# Patient Record
Sex: MALE | Race: BLACK OR AFRICAN AMERICAN | NOT HISPANIC OR LATINO | Employment: UNEMPLOYED | URBAN - METROPOLITAN AREA
[De-identification: names, ages, dates, MRNs, and addresses within clinical notes are randomized per-mention and may not be internally consistent; named-entity substitution may affect disease eponyms.]

---

## 2017-04-19 ENCOUNTER — HISTORICAL (OUTPATIENT)
Dept: ENDOSCOPY | Facility: HOSPITAL | Age: 77
End: 2017-04-19

## 2017-05-18 ENCOUNTER — HISTORICAL (OUTPATIENT)
Dept: ENDOSCOPY | Facility: HOSPITAL | Age: 77
End: 2017-05-18

## 2017-12-31 LAB
INFLUENZA A ANTIGEN, POC: POSITIVE
INFLUENZA B ANTIGEN, POC: NEGATIVE

## 2018-01-03 ENCOUNTER — HISTORICAL (OUTPATIENT)
Dept: ADMINISTRATIVE | Facility: HOSPITAL | Age: 78
End: 2018-01-03

## 2018-01-03 LAB
APPEARANCE, UA: ABNORMAL
BACTERIA SPEC CULT: ABNORMAL /HPF
BILIRUB UR QL STRIP: ABNORMAL
COLOR UR: ABNORMAL
GLUCOSE (UA): NEGATIVE
HGB UR QL STRIP: NEGATIVE
KETONES UR QL STRIP: NEGATIVE
LEUKOCYTE ESTERASE UR QL STRIP: NEGATIVE
NITRITE UR QL STRIP: NEGATIVE
PH UR STRIP: 6 [PH] (ref 5–9)
PROT UR QL STRIP: ABNORMAL
RBC #/AREA URNS HPF: ABNORMAL /[HPF]
SP GR UR STRIP: 1.03 (ref 1–1.03)
SQUAMOUS EPITHELIAL, UA: ABNORMAL
UROBILINOGEN UR STRIP-ACNC: 1
WBC #/AREA URNS HPF: ABNORMAL /HPF

## 2018-11-19 ENCOUNTER — HISTORICAL (OUTPATIENT)
Dept: ADMINISTRATIVE | Facility: HOSPITAL | Age: 78
End: 2018-11-19

## 2019-09-26 ENCOUNTER — HOSPITAL ENCOUNTER (OUTPATIENT)
Dept: MEDSURG UNIT | Facility: HOSPITAL | Age: 79
End: 2019-09-27
Attending: INTERNAL MEDICINE | Admitting: INTERNAL MEDICINE

## 2019-09-26 LAB
ABS NEUT (OLG): 4.4 X10(3)/MCL (ref 2.1–9.2)
ALBUMIN SERPL-MCNC: 4 GM/DL (ref 3.4–5)
ALBUMIN/GLOB SERPL: 1.3 RATIO (ref 1.1–2)
ALP SERPL-CCNC: 98 UNIT/L (ref 50–136)
ALT SERPL-CCNC: 7 UNIT/L (ref 12–78)
AST SERPL-CCNC: 15 UNIT/L (ref 15–37)
BASOPHILS # BLD AUTO: 0 X10(3)/MCL (ref 0–0.2)
BASOPHILS NFR BLD AUTO: 0 %
BILIRUB SERPL-MCNC: 0.7 MG/DL (ref 0.2–1)
BILIRUBIN DIRECT+TOT PNL SERPL-MCNC: 0.2 MG/DL (ref 0–0.5)
BILIRUBIN DIRECT+TOT PNL SERPL-MCNC: 0.5 MG/DL (ref 0–0.8)
BNP BLD-MCNC: 185 PG/ML (ref 0–100)
BUN SERPL-MCNC: 35 MG/DL (ref 7–18)
CALCIUM SERPL-MCNC: 9.3 MG/DL (ref 8.5–10.1)
CHLORIDE SERPL-SCNC: 104 MMOL/L (ref 98–107)
CO2 SERPL-SCNC: 31 MMOL/L (ref 21–32)
CREAT SERPL-MCNC: 1.61 MG/DL (ref 0.7–1.3)
EOSINOPHIL # BLD AUTO: 0.3 X10(3)/MCL (ref 0–0.9)
EOSINOPHIL NFR BLD AUTO: 4 %
ERYTHROCYTE [DISTWIDTH] IN BLOOD BY AUTOMATED COUNT: 13.1 % (ref 11.5–17)
GLOBULIN SER-MCNC: 3.1 GM/DL (ref 2.4–3.5)
GLUCOSE SERPL-MCNC: 108 MG/DL (ref 74–106)
HCT VFR BLD AUTO: 42.5 % (ref 42–52)
HGB BLD-MCNC: 13.5 GM/DL (ref 14–18)
LYMPHOCYTES # BLD AUTO: 1.7 X10(3)/MCL (ref 0.6–4.6)
LYMPHOCYTES NFR BLD AUTO: 25 %
MCH RBC QN AUTO: 31.1 PG (ref 27–31)
MCHC RBC AUTO-ENTMCNC: 31.8 GM/DL (ref 33–36)
MCV RBC AUTO: 97.9 FL (ref 80–94)
MONOCYTES # BLD AUTO: 0.6 X10(3)/MCL (ref 0.1–1.3)
MONOCYTES NFR BLD AUTO: 8 %
NEUTROPHILS # BLD AUTO: 4.4 X10(3)/MCL (ref 2.1–9.2)
NEUTROPHILS NFR BLD AUTO: 63 %
PLATELET # BLD AUTO: 185 X10(3)/MCL (ref 130–400)
PMV BLD AUTO: 10 FL (ref 9.4–12.4)
POC TROPONIN: 0.01 NG/ML (ref 0–0.08)
POTASSIUM SERPL-SCNC: 4 MMOL/L (ref 3.5–5.1)
PROT SERPL-MCNC: 7.1 GM/DL (ref 6.4–8.2)
RBC # BLD AUTO: 4.34 X10(6)/MCL (ref 4.7–6.1)
SODIUM SERPL-SCNC: 140 MMOL/L (ref 136–145)
WBC # SPEC AUTO: 7 X10(3)/MCL (ref 4.5–11.5)

## 2019-09-27 LAB
CK MB SERPL-MCNC: 1.6 NG/ML (ref 0.5–3.6)
CK MB SERPL-MCNC: 2 NG/ML (ref 0.5–3.6)
CK SERPL-CCNC: 114 UNIT/L (ref 39–308)
CK SERPL-CCNC: 94 UNIT/L (ref 39–308)
TROPONIN I SERPL-MCNC: 0.02 NG/ML (ref 0.02–0.49)
TROPONIN I SERPL-MCNC: 0.02 NG/ML (ref 0.02–0.49)

## 2019-10-14 ENCOUNTER — HISTORICAL (OUTPATIENT)
Dept: LAB | Facility: HOSPITAL | Age: 79
End: 2019-10-14

## 2019-10-14 LAB
BUN SERPL-MCNC: 26 MG/DL (ref 7–18)
CALCIUM SERPL-MCNC: 9.3 MG/DL (ref 8.5–10.1)
CHLORIDE SERPL-SCNC: 105 MMOL/L (ref 98–107)
CO2 SERPL-SCNC: 31 MMOL/L (ref 21–32)
CREAT SERPL-MCNC: 1.37 MG/DL (ref 0.7–1.3)
CREAT/UREA NIT SERPL: 19
GLUCOSE SERPL-MCNC: 93 MG/DL (ref 74–106)
MAGNESIUM SERPL-MCNC: 2.5 MG/DL (ref 1.8–2.4)
POTASSIUM SERPL-SCNC: 3.9 MMOL/L (ref 3.5–5.1)
SODIUM SERPL-SCNC: 141 MMOL/L (ref 136–145)

## 2019-10-28 ENCOUNTER — HISTORICAL (OUTPATIENT)
Dept: LAB | Facility: HOSPITAL | Age: 79
End: 2019-10-28

## 2019-10-28 LAB
ABS NEUT (OLG): 3.74 X10(3)/MCL (ref 2.1–9.2)
ALBUMIN SERPL-MCNC: 3.9 GM/DL (ref 3.4–5)
ALBUMIN/GLOB SERPL: 1.3 RATIO (ref 1.1–2)
ALP SERPL-CCNC: 112 UNIT/L (ref 50–136)
ALT SERPL-CCNC: 11 UNIT/L (ref 12–78)
APPEARANCE, UA: CLEAR
AST SERPL-CCNC: 16 UNIT/L (ref 15–37)
BACTERIA SPEC CULT: ABNORMAL /HPF
BASOPHILS # BLD AUTO: 0 X10(3)/MCL (ref 0–0.2)
BASOPHILS NFR BLD AUTO: 0 %
BILIRUB SERPL-MCNC: 0.6 MG/DL (ref 0.2–1)
BILIRUB UR QL STRIP: ABNORMAL
BILIRUBIN DIRECT+TOT PNL SERPL-MCNC: 0.2 MG/DL (ref 0–0.5)
BILIRUBIN DIRECT+TOT PNL SERPL-MCNC: 0.4 MG/DL (ref 0–0.8)
BNP BLD-MCNC: 162 PG/ML (ref 0–125)
BUN SERPL-MCNC: 21 MG/DL (ref 7–18)
CALCIUM SERPL-MCNC: 9.2 MG/DL (ref 8.5–10.1)
CHLORIDE SERPL-SCNC: 107 MMOL/L (ref 98–107)
CO2 SERPL-SCNC: 31 MMOL/L (ref 21–32)
COLOR UR: ABNORMAL
CREAT SERPL-MCNC: 1.06 MG/DL (ref 0.7–1.3)
EOSINOPHIL # BLD AUTO: 0.2 X10(3)/MCL (ref 0–0.9)
EOSINOPHIL NFR BLD AUTO: 4 %
ERYTHROCYTE [DISTWIDTH] IN BLOOD BY AUTOMATED COUNT: 13.8 % (ref 11.5–17)
GLOBULIN SER-MCNC: 3 GM/DL (ref 2.4–3.5)
GLUCOSE (UA): ABNORMAL
GLUCOSE SERPL-MCNC: 81 MG/DL (ref 74–106)
HCT VFR BLD AUTO: 37 % (ref 42–52)
HGB BLD-MCNC: 11.7 GM/DL (ref 14–18)
HGB UR QL STRIP: NEGATIVE
IMM GRANULOCYTES # BLD AUTO: 0 10*3/UL
IMM GRANULOCYTES NFR BLD AUTO: 1 %
KETONES UR QL STRIP: ABNORMAL
LEUKOCYTE ESTERASE UR QL STRIP: NEGATIVE
LYMPHOCYTES # BLD AUTO: 1.4 X10(3)/MCL (ref 0.6–4.6)
LYMPHOCYTES NFR BLD AUTO: 25 %
MAGNESIUM SERPL-MCNC: 2.3 MG/DL (ref 1.8–2.4)
MCH RBC QN AUTO: 31.1 PG (ref 27–31)
MCHC RBC AUTO-ENTMCNC: 31.6 GM/DL (ref 33–36)
MCV RBC AUTO: 98.4 FL (ref 80–94)
MONOCYTES # BLD AUTO: 0.3 X10(3)/MCL (ref 0.1–1.3)
MONOCYTES NFR BLD AUTO: 5 %
NEUTROPHILS # BLD AUTO: 3.74 X10(3)/MCL (ref 2.1–9.2)
NEUTROPHILS NFR BLD AUTO: 65 %
NITRITE UR QL STRIP: NEGATIVE
PH UR STRIP: 5 [PH] (ref 5–9)
PLATELET # BLD AUTO: 190 X10(3)/MCL (ref 130–400)
PMV BLD AUTO: 11 FL (ref 9.4–12.4)
POTASSIUM SERPL-SCNC: 3.5 MMOL/L (ref 3.5–5.1)
PROT SERPL-MCNC: 6.9 GM/DL (ref 6.4–8.2)
PROT UR QL STRIP: ABNORMAL
RBC # BLD AUTO: 3.76 X10(6)/MCL (ref 4.7–6.1)
RBC #/AREA URNS HPF: ABNORMAL /[HPF]
SODIUM SERPL-SCNC: 144 MMOL/L (ref 136–145)
SP GR UR STRIP: 1.04 (ref 1–1.03)
SQUAMOUS EPITHELIAL, UA: ABNORMAL
UROBILINOGEN UR STRIP-ACNC: 1
WBC # SPEC AUTO: 5.8 X10(3)/MCL (ref 4.5–11.5)
WBC #/AREA URNS HPF: ABNORMAL /HPF

## 2022-04-10 ENCOUNTER — HISTORICAL (OUTPATIENT)
Dept: ADMINISTRATIVE | Facility: HOSPITAL | Age: 82
End: 2022-04-10
Payer: MEDICARE

## 2022-04-28 VITALS
OXYGEN SATURATION: 94 % | DIASTOLIC BLOOD PRESSURE: 71 MMHG | HEIGHT: 67 IN | SYSTOLIC BLOOD PRESSURE: 122 MMHG | WEIGHT: 143.06 LBS | BODY MASS INDEX: 22.45 KG/M2

## 2022-04-30 NOTE — ED PROVIDER NOTES
Patient:   Tenzin Leong            MRN: [id number]            FIN: [id number]               Age:   79 years     Sex:  Male     :  1940   Associated Diagnoses:   Chest pain   Author:   Lon Narayanan MD      Basic Information   Time seen: Date & time 2019 22:05:00.   History source: Patient, caretaker.   Arrival mode: Private vehicle, wheelchair.   History limitation: None.   Additional information: Patient's physician(s): Sera CALABRESE, Rafa CONKLIN.      History of Present Illness   The patient presents with   80 y/o AAM presents to the ED for complaints of chest tightness and SOB. Pt states that this started around 1600. The tightness increases with exertion and resolves with rest. On arrival the pt was having sweats. Pt states that he exercised more than normal yesterday. .  The onset was 2019 16:00:00 .  The course/duration of symptoms is fluctuating in intensity.  Location: Generalized chest. Radiating pain: none. The character of symptoms is tightness and pressure.  The degree at onset was minimal.  The degree at maximum was moderate.  The degree at present is none.  The exacerbating factor is exertion.  The relieving factor is rest.  Risk factors consist of none.  Prior episodes: none.  Therapy today None.  Associated symptoms: shortness of breath.        Review of Systems   Constitutional symptoms:  Sweats   Skin symptoms:  Negative except as documented in HPI   Eye symptoms:  Negative except as documented in HPI.   ENMT symptoms:  Negative except as documented in HPI.   Respiratory symptoms:  Shortness of breath   Cardiovascular symptoms:  Chest pain, tightness, pressure   Gastrointestinal symptoms:  Negative except as documented in HPI.   Musculoskeletal symptoms:  Negative except as documented in HPI.   Neurologic symptoms:  Negative except as documented in HPI.   Psychiatric symptoms:  Negative except as documented in HPI             Additional review of systems information: All  other systems reviewed and otherwise negative.      Health Status   Allergies: No known allergies.   Medications:  (Selected)   Inpatient Medications  Ordered  aspirin 325 mg oral tablet: 325 mg, form: Tab, Oral, Daily, first dose 09/26/19 22:20:00 CDT, STAT, 4 chew tab = 5 grains  Documented Medications  Documented  Advair Diskus 250 mcg-50 mcg inhalation powder: 1 puff(s), INH, BID, 0 Refill(s)  Aricept 5 mg oral tablet: 5 mg = 1 tab(s), Oral, Once a day (at bedtime), # 30 tab(s), 0 Refill(s)  Namenda XR: = 1 tab(s), Oral, Daily, 25mg, 0 Refill(s)  Requip 3 mg oral tablet: 3 mg = 1 tab(s), Oral, BID, # 90 tab(s), 0 Refill(s)  Sinemet 25 mg-100 mg oral tablet: 1 tab(s), Oral, TID, # 270 tab(s), 0 Refill(s)  Singulair 10 mg oral TABLET: 10 mg = 1 tab(s), Oral, qPM, # 30 tab(s), 0 Refill(s)  Spiriva: 0 Refill(s)  albuterol 2.5 mg/3 mL (0.083%) inhalation solution: 2.5 mg = 3 mL, INH, q6hr, 0 Refill(s)  fluticasone 50 mcg inhalation powder: 1 puff(s), INH, BID, 0 Refill(s)  meclizine 25 mg oral tablet: 25 mg = 1 tab(s), Oral, BID, # 60 tab(s), 0 Refill(s)  meloxicam 7.5 mg oral tablet: 7.5 mg = 1 tab(s), Oral, Daily, # 30 tab(s), 0 Refill(s).      Past Medical/ Family/ Social History   Medical history:    No active or resolved past medical history items have been selected or recorded..   Surgical history:    Biopsy Gastrointestinal on 5/18/2017 at 77 Years.  Comments:  5/18/2017 15:55 Lorenzo Vora RN  auto-populated from documented surgical case  Colonoscopy on 5/18/2017 at 77 Years.  Comments:  5/18/2017 15:55 Lorenzo Vora RN J  auto-populated from documented surgical case  Bleeder Control Gastrointestinal on 5/18/2017 at 77 Years.  Comments:  5/18/2017 15:55 Lorenzo Vora RN  auto-populated from documented surgical case  Colonoscopy on 4/19/2017 at 77 Years.  Comments:  4/19/2017 15:05 ELISHA Koehler RN, Abimael LINARES  auto-populated from documented surgical case.   Family history:    No  family history items have been selected or recorded..   Social history: Tobacco use: Denies, Drug use: Denies.      Physical Examination               Vital Signs   Vital Signs   9/26/2019 20:05 CDT      Temperature Oral          36.9 DegC                             Temperature Oral (calculated)             98.42 DegF                             Peripheral Pulse Rate     73 bpm                             Respiratory Rate          18 br/min                             SpO2                      99 %                             Oxygen Therapy            Room air                             Systolic Blood Pressure   112 mmHg                             Diastolic Blood Pressure  67 mmHg  .   Measurements   9/26/2019 20:05 CDT      Weight Dosing             56 kg                             Weight Measured and Calculated in Lbs     123.46 lb                             Weight Estimated          56 kg                             Height/Length Dosing      175 cm                             Height/Length Estimated   175 cm                             Body Mass Index Estimated 18.29 kg/m2  .   Basic Oxygen Information   9/26/2019 20:05 CDT      SpO2                      99 %                             Oxygen Therapy            Room air  .   General:  Alert, no acute distress, well appearing, conversant   Neurological:  Alert and oriented to person, place, time, and situation, No focal neurological deficit observed, CN II-XII intact, normal sensory observed, normal motor observed, normal speech observed, normal coordination observed.    Skin:  Warm, dry, intact   Head:  Normocephalic, atraumatic   Neck:  Supple, trachea midline   Eye:  Pupils are equal, round and reactive to light, extraocular movements are intact, normal conjunctiva   Ears, nose, mouth and throat:  Oral mucosa moist.   Cardiovascular:  Regular rate and rhythm, No murmur, Normal peripheral perfusion, No edema   Respiratory:  Lungs are clear to auscultation,  respirations are non-labored, breath sounds are equal, Symmetrical chest wall expansion.    Chest wall:  No tenderness.   Musculoskeletal:  Normal ROM, normal strength, no tenderness, no swelling, no deformity.    Gastrointestinal:  Soft, Nontender, Non distended   Psychiatric:  Cooperative, appropriate mood & affect, normal judgment.       Medical Decision Making   Documents reviewed:  Emergency department nurses' notes.   Orders  Launch Order Profile (Selected)   Inpatient Orders  Ordered  Blood Pressure: 19 20:12:00 CDT, Stop date 19 20:12:00 CDT, Monitor blood pressure.  Cardiac Monitorin19 20:12:00 CDT, Constant Order, Place on telemetry.  Contact MD for order for Aspirin and NTG.: 19 20:12:00 CDT, Contact MD for order for Aspirin and NTG.  EKG Adult 12 Lead: 19 20:12:00 CDT, Stat, Chest Pain, 19 20:12:00 CDT, Ambulatory, Standard Precautions, Show to provider upon completion., -1, -1, 19 20:12:00 CDT  Oxygen Therapy: 19 20:12:00 CDT, Nasal Cannula, Maintain O2 saturation > 93%., CM Oxygen  Pulse Oximetry: 19 20:12:00 CDT, Stop date 19 20:12:00 CDT, Place on pulse oximetry.  Monitor oxygen saturation.  Saline Lock Insert: 19 20:12:00 CDT, Stop date 19 20:12:00 CDT  aspirin 325 mg oral tablet: 325 mg, form: Tab, Oral, Daily, first dose 19 22:20:00 CDT, STAT, 4 chew tab = 5 grains  Ordered (Exam Completed)  XR Chest 1 View: Stat, 19 20:12:00 CDT, Chest Pain, None, Ambulatory, Rad Type, Not Scheduled, 19 20:12:00 CDT  Completed  Automated Diff: Stat collect, 19 20:22:00 CDT, Blood, Collected, Stop date 19 20:22:00 CDT, Lab Collect, Print Label By Order Location, 19 20:12:00 CDT  CBC w/ Auto Diff: Stat collect, 09/26/19 20:12:00 CDT, Blood, Stop date 19 20:12:00 CDT, Lab Collect, Print Label By Order Location, 19 20:12:00 CDT  CMP: Stat collect, 19 20:12:00 CDT, Blood, Stop date 19  20:12:00 CDT, Lab Collect, Print Label By Order Location, 09/26/19 20:12:00 CDT  Estimated Glomerular Filtration Rate: Stat collect, 09/26/19 20:22:00 CDT, Blood, Collected, Stop date 09/26/19 20:22:00 CDT, Lab Collect, Print Label By Order Location, 09/26/19 20:12:00 CDT  POC BNP iSTAT request: Blood, Stat collect, 09/26/19 20:12:00 CDT by Alysa Hurst RN, Stop date 09/26/19 20:12:00 CDT, Lab Collect, Print Label By Order Location  POC BNP iSTAT: Blood, Stat collect, Collected, 09/26/19 20:36:50 CDT  POC Istat ER Troponin: Blood, Stat collect, Collected, 09/26/19 20:26:41 CDT  POC iSTAT ER Troponin request: Blood, Stat collect, 09/26/19 20:12:00 CDT by Alysa Hurst RN, Stop date 09/26/19 20:12:00 CDT, Lab Collect, Print Label By Order Location.   Electrocardiogram:  Time 9/26/2019 20:00:00, rate 75, normal sinus rhythm, No ST-T changes, EP Interp, Ectopy Premature ventricular contractions (occasional), QRS interval Left ventricular hypertrophy.    Results review:  Lab results : Lab View   9/26/2019 20:37 CDT      Troponin I Poc            0.01    9/26/2019 20:36 CDT      POC BNP iSTAT             185 pg/mL  HI    9/26/2019 20:26 CDT      POC Troponin              0.01 ng/mL    9/26/2019 20:22 CDT      Sodium Lvl                140 mmol/L                             Potassium Lvl             4.0 mmol/L                             Chloride                  104 mmol/L                             CO2                       31.0 mmol/L                             Calcium Lvl               9.3 mg/dL                             Glucose Lvl               108 mg/dL  HI                             BUN                       35.0 mg/dL  HI                             Creatinine                1.61 mg/dL  HI                             eGFR-AA                   53 mL/min/1.73 m2  NA                             eGFR-ALEXEI                  44 mL/min/1.73 m2  NA                             Bili Total                0.7 mg/dL                              Bili Direct               0.20 mg/dL                             Bili Indirect             0.50 mg/dL                             AST                       15 unit/L                             ALT                       7 unit/L  LOW                             Alk Phos                  98 unit/L                             Total Protein             7.1 gm/dL                             Albumin Lvl               4.00 gm/dL                             Globulin                  3.10 gm/dL                             A/G Ratio                 1.3 ratio                             WBC                       7.0 x10(3)/mcL                             RBC                       4.34 x10(6)/mcL  LOW                             Hgb                       13.5 gm/dL  LOW                             Hct                       42.5 %                             Platelet                  185 x10(3)/mcL                             MCV                       97.9 fL  HI                             MCH                       31.1 pg  HI                             MCHC                      31.8 gm/dL  LOW                             RDW                       13.1 %                             MPV                       10.0 fL                             Abs Neut                  4.40 x10(3)/mcL                             Neutro Auto               63 %  NA                             Lymph Auto                25 %  NA                             Mono Auto                 8 %  NA                             Eos Auto                  4 %  NA                             Abs Eos                   0.3 x10(3)/mcL                             Basophil Auto             0 %  NA                             Abs Neutro                4.40 x10(3)/mcL                             Abs Lymph                 1.7 x10(3)/mcL                             Abs Mono                  0.6 x10(3)/mcL                             Abs Baso                   0.0 x10(3)/mcL  .   Chest X-Ray:  No acute disease process, interpretation by Emergency Physician.       Reexamination/ Reevaluation   Course: improving.   Pain status: resolved.      Impression and Plan   Diagnosis   Chest pain ([id number] R07.9)   Plan   Condition: Stable.    Disposition: Admit.    Counseled: Patient, Family, Regarding diagnosis, Regarding diagnostic results, Regarding treatment plan, Patient indicated understanding of instructions, Family understood.    Notes: I, Tiff Liang, acted solely as a scribe for and in the presence of Dr. Narayanan who performed the service. .       Addendum      Teaching-Supervisory Addendum-Brief   Notes: I, Dr. Narayanan, personally performed the services described in this documentation as scribed in my presence and it is both accurate and complete..

## 2022-09-17 ENCOUNTER — HISTORICAL (OUTPATIENT)
Dept: ADMINISTRATIVE | Facility: HOSPITAL | Age: 82
End: 2022-09-17
Payer: MEDICARE

## 2022-09-29 ENCOUNTER — HOSPITAL ENCOUNTER (EMERGENCY)
Facility: HOSPITAL | Age: 82
Discharge: HOME | End: 2022-09-29
Attending: STUDENT IN AN ORGANIZED HEALTH CARE EDUCATION/TRAINING PROGRAM
Payer: MEDICARE

## 2022-09-29 VITALS
HEART RATE: 64 BPM | BODY MASS INDEX: 20.31 KG/M2 | TEMPERATURE: 98 F | SYSTOLIC BLOOD PRESSURE: 147 MMHG | HEIGHT: 69 IN | RESPIRATION RATE: 16 BRPM | DIASTOLIC BLOOD PRESSURE: 89 MMHG | WEIGHT: 137.13 LBS | OXYGEN SATURATION: 99 %

## 2022-09-29 DIAGNOSIS — R07.89 CHEST WALL PAIN: Primary | ICD-10-CM

## 2022-09-29 LAB
ALBUMIN SERPL-MCNC: 3.9 GM/DL (ref 3.4–4.8)
ALBUMIN/GLOB SERPL: 1.2 RATIO (ref 1.1–2)
ALP SERPL-CCNC: 97 UNIT/L (ref 40–150)
ALT SERPL-CCNC: 10 UNIT/L (ref 0–55)
AST SERPL-CCNC: 14 UNIT/L (ref 5–34)
BASOPHILS # BLD AUTO: 0.04 X10(3)/MCL (ref 0–0.2)
BASOPHILS NFR BLD AUTO: 0.8 %
BILIRUBIN DIRECT+TOT PNL SERPL-MCNC: 1.1 MG/DL
BUN SERPL-MCNC: 28.3 MG/DL (ref 8.4–25.7)
CALCIUM SERPL-MCNC: 9.6 MG/DL (ref 8.8–10)
CHLORIDE SERPL-SCNC: 104 MMOL/L (ref 98–107)
CO2 SERPL-SCNC: 31 MMOL/L (ref 23–31)
CREAT SERPL-MCNC: 1.13 MG/DL (ref 0.73–1.18)
EOSINOPHIL # BLD AUTO: 1.09 X10(3)/MCL (ref 0–0.9)
EOSINOPHIL NFR BLD AUTO: 20.6 %
ERYTHROCYTE [DISTWIDTH] IN BLOOD BY AUTOMATED COUNT: 13.1 % (ref 11.5–17)
GFR SERPLBLD CREATININE-BSD FMLA CKD-EPI: >60 MLS/MIN/1.73/M2
GLOBULIN SER-MCNC: 3.2 GM/DL (ref 2.4–3.5)
GLUCOSE SERPL-MCNC: 92 MG/DL (ref 82–115)
HCT VFR BLD AUTO: 39.3 % (ref 42–52)
HGB BLD-MCNC: 12.6 GM/DL (ref 14–18)
IMM GRANULOCYTES # BLD AUTO: 0.02 X10(3)/MCL (ref 0–0.04)
IMM GRANULOCYTES NFR BLD AUTO: 0.4 %
LYMPHOCYTES # BLD AUTO: 1.21 X10(3)/MCL (ref 0.6–4.6)
LYMPHOCYTES NFR BLD AUTO: 22.9 %
MCH RBC QN AUTO: 31.5 PG (ref 27–31)
MCHC RBC AUTO-ENTMCNC: 32.1 MG/DL (ref 33–36)
MCV RBC AUTO: 98.3 FL (ref 80–94)
MONOCYTES # BLD AUTO: 0.52 X10(3)/MCL (ref 0.1–1.3)
MONOCYTES NFR BLD AUTO: 9.8 %
NEUTROPHILS # BLD AUTO: 2.4 X10(3)/MCL (ref 2.1–9.2)
NEUTROPHILS NFR BLD AUTO: 45.5 %
NRBC BLD AUTO-RTO: 0 %
PLATELET # BLD AUTO: 141 X10(3)/MCL (ref 130–400)
PLATELETS.RETICULATED NFR BLD AUTO: 6.2 % (ref 0.9–11.2)
PMV BLD AUTO: 11.2 FL (ref 7.4–10.4)
POTASSIUM SERPL-SCNC: 3.3 MMOL/L (ref 3.5–5.1)
PROT SERPL-MCNC: 7.1 GM/DL (ref 5.8–7.6)
RBC # BLD AUTO: 4 X10(6)/MCL (ref 4.7–6.1)
SODIUM SERPL-SCNC: 144 MMOL/L (ref 136–145)
TROPONIN I SERPL-MCNC: 0.03 NG/ML (ref 0–0.04)
WBC # SPEC AUTO: 5.3 X10(3)/MCL (ref 4.5–11.5)

## 2022-09-29 PROCEDURE — 80053 COMPREHEN METABOLIC PANEL: CPT | Performed by: STUDENT IN AN ORGANIZED HEALTH CARE EDUCATION/TRAINING PROGRAM

## 2022-09-29 PROCEDURE — 84484 ASSAY OF TROPONIN QUANT: CPT | Performed by: STUDENT IN AN ORGANIZED HEALTH CARE EDUCATION/TRAINING PROGRAM

## 2022-09-29 PROCEDURE — 99285 EMERGENCY DEPT VISIT HI MDM: CPT | Mod: 25

## 2022-09-29 PROCEDURE — 85025 COMPLETE CBC W/AUTO DIFF WBC: CPT | Performed by: STUDENT IN AN ORGANIZED HEALTH CARE EDUCATION/TRAINING PROGRAM

## 2022-09-29 PROCEDURE — 93005 ELECTROCARDIOGRAM TRACING: CPT

## 2022-09-29 PROCEDURE — 36415 COLL VENOUS BLD VENIPUNCTURE: CPT | Performed by: STUDENT IN AN ORGANIZED HEALTH CARE EDUCATION/TRAINING PROGRAM

## 2022-09-29 RX ORDER — MEMANTINE HYDROCHLORIDE 28 MG/1
1 CAPSULE, EXTENDED RELEASE ORAL DAILY
Status: ON HOLD | COMMUNITY
End: 2023-02-06 | Stop reason: HOSPADM

## 2022-09-29 RX ORDER — CARBIDOPA AND LEVODOPA 25; 100 MG/1; MG/1
1 TABLET, EXTENDED RELEASE ORAL 3 TIMES DAILY
Status: ON HOLD | COMMUNITY
End: 2023-02-06 | Stop reason: HOSPADM

## 2022-09-29 RX ORDER — MIDODRINE HYDROCHLORIDE 2.5 MG/1
2.5 TABLET ORAL DAILY PRN
COMMUNITY

## 2022-09-29 RX ORDER — POTASSIUM CHLORIDE 600 MG/1
8 TABLET, FILM COATED, EXTENDED RELEASE ORAL
Status: ON HOLD | COMMUNITY
End: 2023-02-06 | Stop reason: HOSPADM

## 2022-09-29 RX ORDER — FLUTICASONE PROPIONATE 50 MCG
1 SPRAY, SUSPENSION (ML) NASAL
COMMUNITY

## 2022-09-29 RX ORDER — MECLIZINE HCL 25MG 25 MG/1
25 TABLET, CHEWABLE ORAL 2 TIMES DAILY PRN
Status: ON HOLD | COMMUNITY
End: 2023-02-06 | Stop reason: HOSPADM

## 2022-09-29 RX ORDER — MONTELUKAST SODIUM 10 MG/1
10 TABLET ORAL NIGHTLY
COMMUNITY

## 2022-09-29 RX ORDER — MELOXICAM 7.5 MG/1
7.5 TABLET ORAL DAILY PRN
Status: ON HOLD | COMMUNITY
End: 2023-02-06 | Stop reason: HOSPADM

## 2022-09-29 RX ORDER — CETIRIZINE HYDROCHLORIDE 5 MG/1
5 TABLET ORAL DAILY
Status: ON HOLD | COMMUNITY
Start: 2022-07-16 | End: 2023-02-06 | Stop reason: HOSPADM

## 2022-09-29 RX ORDER — DONEPEZIL HYDROCHLORIDE 5 MG/1
5 TABLET, FILM COATED ORAL
Status: ON HOLD | COMMUNITY
End: 2023-02-06 | Stop reason: HOSPADM

## 2022-09-29 RX ORDER — ROPINIROLE 3 MG/1
3 TABLET, FILM COATED ORAL 2 TIMES DAILY
Status: ON HOLD | COMMUNITY
End: 2023-02-06 | Stop reason: HOSPADM

## 2022-09-29 RX ORDER — FLUDROCORTISONE ACETATE 0.1 MG/1
0.1 TABLET ORAL
Status: ON HOLD | COMMUNITY
End: 2023-02-06 | Stop reason: SDUPTHER

## 2022-09-29 RX ORDER — ALBUTEROL SULFATE 0.83 MG/ML
2.5 SOLUTION RESPIRATORY (INHALATION) EVERY 4 HOURS
Status: ON HOLD | COMMUNITY
Start: 2022-04-20 | End: 2023-02-06 | Stop reason: SDUPTHER

## 2022-09-29 RX ORDER — MIRABEGRON 25 MG/1
25 TABLET, FILM COATED, EXTENDED RELEASE ORAL
COMMUNITY
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
## 2023-01-16 PROBLEM — G20.A1 PARKINSON DISEASE: Chronic | Status: ACTIVE | Noted: 2023-01-16

## 2023-01-16 PROBLEM — R53.1 GENERAL WEAKNESS: Status: ACTIVE | Noted: 2023-01-16

## 2023-01-16 PROBLEM — F02.80 ALZHEIMER DISEASE: Chronic | Status: ACTIVE | Noted: 2023-01-16

## 2023-01-16 PROBLEM — G30.9 ALZHEIMER DISEASE: Chronic | Status: ACTIVE | Noted: 2023-01-16

## 2023-02-28 ENCOUNTER — LAB REQUISITION (OUTPATIENT)
Dept: LAB | Facility: HOSPITAL | Age: 83
End: 2023-02-28
Payer: MEDICARE

## 2023-02-28 DIAGNOSIS — N39.0 URINARY TRACT INFECTION, SITE NOT SPECIFIED: ICD-10-CM

## 2023-02-28 LAB
APPEARANCE UR: CLEAR
BACTERIA #/AREA URNS AUTO: NORMAL /HPF
BILIRUB UR QL STRIP.AUTO: NEGATIVE MG/DL
COLOR UR AUTO: YELLOW
GLUCOSE UR QL STRIP.AUTO: NEGATIVE MG/DL
KETONES UR QL STRIP.AUTO: NEGATIVE MG/DL
LEUKOCYTE ESTERASE UR QL STRIP.AUTO: NEGATIVE UNIT/L
NITRITE UR QL STRIP.AUTO: NEGATIVE
PH UR STRIP.AUTO: 7.5 [PH]
PROT UR QL STRIP.AUTO: NEGATIVE MG/DL
RBC #/AREA URNS AUTO: <5 /HPF
RBC UR QL AUTO: NEGATIVE UNIT/L
SP GR UR STRIP.AUTO: 1.02 (ref 1–1.03)
SQUAMOUS #/AREA URNS AUTO: <5 /HPF
UROBILINOGEN UR STRIP-ACNC: 1 MG/DL
WBC #/AREA URNS AUTO: <5 /HPF

## 2023-02-28 PROCEDURE — 87088 URINE BACTERIA CULTURE: CPT | Performed by: NURSE PRACTITIONER

## 2023-02-28 PROCEDURE — 81001 URINALYSIS AUTO W/SCOPE: CPT | Performed by: NURSE PRACTITIONER

## 2023-03-02 LAB — BACTERIA UR CULT: NORMAL

## 2023-03-18 ENCOUNTER — LAB REQUISITION (OUTPATIENT)
Dept: LAB | Facility: HOSPITAL | Age: 83
End: 2023-03-18
Payer: MEDICARE

## 2023-03-18 DIAGNOSIS — N39.0 URINARY TRACT INFECTION, SITE NOT SPECIFIED: ICD-10-CM

## 2023-03-18 PROCEDURE — 87088 URINE BACTERIA CULTURE: CPT | Performed by: OTOLARYNGOLOGY

## 2023-03-19 LAB
BACTERIA UR CULT: ABNORMAL

## 2023-04-25 ENCOUNTER — PATIENT MESSAGE (OUTPATIENT)
Dept: RESEARCH | Facility: HOSPITAL | Age: 83
End: 2023-04-25
Payer: MEDICARE

## 2023-05-02 ENCOUNTER — PATIENT MESSAGE (OUTPATIENT)
Dept: RESEARCH | Facility: HOSPITAL | Age: 83
End: 2023-05-02
Payer: MEDICARE

## 2023-05-10 ENCOUNTER — LAB REQUISITION (OUTPATIENT)
Dept: LAB | Facility: HOSPITAL | Age: 83
End: 2023-05-10
Payer: MEDICARE

## 2023-05-10 DIAGNOSIS — Z87.440 PERSONAL HISTORY OF URINARY (TRACT) INFECTIONS: ICD-10-CM

## 2023-05-10 DIAGNOSIS — G30.9 ALZHEIMER'S DISEASE, UNSPECIFIED (CODE): ICD-10-CM

## 2023-05-10 LAB
AMORPH URATE CRY URNS QL MICRO: ABNORMAL /HPF
APPEARANCE UR: ABNORMAL
BACTERIA #/AREA URNS AUTO: ABNORMAL /HPF
BILIRUB UR QL STRIP.AUTO: NEGATIVE MG/DL
CAOX CRY URNS QL MICRO: ABNORMAL /HPF
COLOR UR: YELLOW
GLUCOSE UR QL STRIP.AUTO: NEGATIVE MG/DL
KETONES UR QL STRIP.AUTO: ABNORMAL MG/DL
LEUKOCYTE ESTERASE UR QL STRIP.AUTO: NEGATIVE UNIT/L
NITRITE UR QL STRIP.AUTO: NEGATIVE
PH UR STRIP.AUTO: 6 [PH]
PROT UR QL STRIP.AUTO: ABNORMAL MG/DL
RBC #/AREA URNS AUTO: ABNORMAL /HPF
RBC UR QL AUTO: NEGATIVE UNIT/L
SP GR UR STRIP.AUTO: 1.02 (ref 1–1.03)
SQUAMOUS #/AREA URNS AUTO: ABNORMAL /HPF
UROBILINOGEN UR STRIP-ACNC: 2 MG/DL
WBC #/AREA URNS AUTO: ABNORMAL /HPF

## 2023-05-10 PROCEDURE — 87088 URINE BACTERIA CULTURE: CPT | Performed by: INTERNAL MEDICINE

## 2023-05-10 PROCEDURE — 87077 CULTURE AEROBIC IDENTIFY: CPT | Mod: 91 | Performed by: INTERNAL MEDICINE

## 2023-05-10 PROCEDURE — 81001 URINALYSIS AUTO W/SCOPE: CPT | Performed by: INTERNAL MEDICINE

## 2023-05-13 LAB
BACTERIA UR CULT: ABNORMAL
BACTERIA UR CULT: ABNORMAL

## 2023-05-16 ENCOUNTER — PATIENT MESSAGE (OUTPATIENT)
Dept: RESEARCH | Facility: HOSPITAL | Age: 83
End: 2023-05-16
Payer: MEDICARE

## 2023-07-31 ENCOUNTER — LAB REQUISITION (OUTPATIENT)
Dept: LAB | Facility: HOSPITAL | Age: 83
End: 2023-07-31
Attending: INTERNAL MEDICINE
Payer: MEDICARE

## 2023-07-31 DIAGNOSIS — G30.9 ALZHEIMER'S DISEASE, UNSPECIFIED (CODE): ICD-10-CM

## 2023-07-31 LAB
APPEARANCE UR: CLEAR
BACTERIA #/AREA URNS AUTO: ABNORMAL /HPF
BILIRUB UR QL STRIP.AUTO: NEGATIVE
COLOR UR: ABNORMAL
GLUCOSE UR QL STRIP.AUTO: NEGATIVE
KETONES UR QL STRIP.AUTO: ABNORMAL
LEUKOCYTE ESTERASE UR QL STRIP.AUTO: NEGATIVE
NITRITE UR QL STRIP.AUTO: NEGATIVE
PH UR STRIP.AUTO: 7 [PH]
PROT UR QL STRIP.AUTO: 30
RBC #/AREA URNS AUTO: >=100 /HPF
RBC UR QL AUTO: ABNORMAL
SP GR UR STRIP.AUTO: 1.01
SQUAMOUS #/AREA URNS AUTO: ABNORMAL /HPF
UROBILINOGEN UR STRIP-ACNC: 2
WBC #/AREA URNS AUTO: ABNORMAL /HPF

## 2023-07-31 PROCEDURE — 81001 URINALYSIS AUTO W/SCOPE: CPT | Performed by: INTERNAL MEDICINE

## 2023-07-31 PROCEDURE — 87088 URINE BACTERIA CULTURE: CPT | Performed by: INTERNAL MEDICINE

## 2023-08-02 LAB — BACTERIA UR CULT: NO GROWTH
